# Patient Record
Sex: MALE | Race: BLACK OR AFRICAN AMERICAN
[De-identification: names, ages, dates, MRNs, and addresses within clinical notes are randomized per-mention and may not be internally consistent; named-entity substitution may affect disease eponyms.]

---

## 2022-05-20 ENCOUNTER — HOSPITAL ENCOUNTER (EMERGENCY)
Dept: HOSPITAL 61 - ER | Age: 22
Discharge: HOME | End: 2022-05-20
Payer: SELF-PAY

## 2022-05-20 VITALS — BODY MASS INDEX: 23.44 KG/M2 | HEIGHT: 75 IN | WEIGHT: 188.5 LBS

## 2022-05-20 VITALS — DIASTOLIC BLOOD PRESSURE: 79 MMHG | SYSTOLIC BLOOD PRESSURE: 146 MMHG

## 2022-05-20 DIAGNOSIS — J98.8: Primary | ICD-10-CM

## 2022-05-20 DIAGNOSIS — Z20.822: ICD-10-CM

## 2022-05-20 LAB
ALBUMIN SERPL-MCNC: 3.3 G/DL (ref 3.4–5)
ALBUMIN/GLOB SERPL: 0.7 {RATIO} (ref 1–1.7)
ALP SERPL-CCNC: 73 U/L (ref 46–116)
ALT SERPL-CCNC: 34 U/L (ref 16–63)
ANION GAP SERPL CALC-SCNC: 9 MMOL/L (ref 6–14)
AST SERPL-CCNC: 19 U/L (ref 15–37)
BASOPHILS # BLD AUTO: 0 X10^3/UL (ref 0–0.2)
BASOPHILS NFR BLD: 0 % (ref 0–3)
BILIRUB SERPL-MCNC: 1.2 MG/DL (ref 0.2–1)
BUN SERPL-MCNC: 13 MG/DL (ref 8–26)
BUN/CREAT SERPL: 12 (ref 6–20)
CALCIUM SERPL-MCNC: 9.3 MG/DL (ref 8.5–10.1)
CHLORIDE SERPL-SCNC: 100 MMOL/L (ref 98–107)
CO2 SERPL-SCNC: 27 MMOL/L (ref 21–32)
CREAT SERPL-MCNC: 1.1 MG/DL (ref 0.7–1.3)
EOSINOPHIL NFR BLD: 0 % (ref 0–3)
EOSINOPHIL NFR BLD: 0 X10^3/UL (ref 0–0.7)
ERYTHROCYTE [DISTWIDTH] IN BLOOD BY AUTOMATED COUNT: 13.1 % (ref 11.5–14.5)
GFR SERPLBLD BASED ON 1.73 SQ M-ARVRAT: 84.5 ML/MIN
GLUCOSE SERPL-MCNC: 123 MG/DL (ref 70–99)
HCT VFR BLD CALC: 38.5 % (ref 39–53)
HGB BLD-MCNC: 13.2 G/DL (ref 13–17.5)
INFLUENZA A PATIENT: NEGATIVE
INFLUENZA B PATIENT: NEGATIVE
LYMPHOCYTES # BLD: 0.9 X10^3/UL (ref 1–4.8)
LYMPHOCYTES NFR BLD AUTO: 13 % (ref 24–48)
MCH RBC QN AUTO: 30 PG (ref 25–35)
MCHC RBC AUTO-ENTMCNC: 34 G/DL (ref 31–37)
MCV RBC AUTO: 86 FL (ref 79–100)
MONO #: 1 X10^3/UL (ref 0–1.1)
MONOCYTES NFR BLD: 14 % (ref 0–9)
NEUT #: 5.2 X10^3/UL (ref 1.8–7.7)
NEUTROPHILS NFR BLD AUTO: 72 % (ref 31–73)
PLATELET # BLD AUTO: 179 X10^3/UL (ref 140–400)
POTASSIUM SERPL-SCNC: 3.8 MMOL/L (ref 3.5–5.1)
PROT SERPL-MCNC: 8.3 G/DL (ref 6.4–8.2)
RBC # BLD AUTO: 4.46 X10^6/UL (ref 4.3–5.7)
SODIUM SERPL-SCNC: 136 MMOL/L (ref 136–145)
WBC # BLD AUTO: 7.2 X10^3/UL (ref 4–11)

## 2022-05-20 PROCEDURE — 99285 EMERGENCY DEPT VISIT HI MDM: CPT

## 2022-05-20 PROCEDURE — 71045 X-RAY EXAM CHEST 1 VIEW: CPT

## 2022-05-20 PROCEDURE — 87428 SARSCOV & INF VIR A&B AG IA: CPT

## 2022-05-20 PROCEDURE — 87040 BLOOD CULTURE FOR BACTERIA: CPT

## 2022-05-20 PROCEDURE — 93005 ELECTROCARDIOGRAM TRACING: CPT

## 2022-05-20 PROCEDURE — 85025 COMPLETE CBC W/AUTO DIFF WBC: CPT

## 2022-05-20 PROCEDURE — 80053 COMPREHEN METABOLIC PANEL: CPT

## 2022-05-20 PROCEDURE — 96360 HYDRATION IV INFUSION INIT: CPT

## 2022-05-20 PROCEDURE — 83605 ASSAY OF LACTIC ACID: CPT

## 2022-05-20 PROCEDURE — 36415 COLL VENOUS BLD VENIPUNCTURE: CPT

## 2022-05-20 RX ADMIN — ALBUTEROL SULFATE ONE MG: 108 AEROSOL, METERED RESPIRATORY (INHALATION) at 22:21

## 2022-05-20 RX ADMIN — ALBUTEROL SULFATE ONE MG: 108 AEROSOL, METERED RESPIRATORY (INHALATION) at 22:50

## 2022-05-20 NOTE — PHYS DOC
Past Medical History


Past Surgical History:  No Surgical History


Smoking Status:  Never Smoker


Alcohol Use:  None





General Adult


EDM:


Chief Complaint:  SHORTNESS OF BREATH





HPI:


HPI:





Patient is a 21  year old male who presents with fever, body aches, shortness of

breath with right side pain.  States that he has had all this for the last 3 to 

4 days.  Recently diagnosed with schizophrenia.  He has not had any 

vaccinations.  Patient is febrile at 102.1.  Denies any other history.  Denies 

abdominal pain, nausea, vomiting, diarrhea, headache, dizziness, syncope, chest 

pain, vision change, focal weakness, numbness or tingling.





Review of Systems:


Review of Systems:


Constitutional:   Denies fever or chills. []


Eyes:   Denies change in visual acuity. []


HENT:   Denies nasal congestion or sore throat. [] 


Respiratory:   Denies cough or shortness of breath. [] 


Cardiovascular:   Denies chest pain or edema. [] 


GI:   Denies abdominal pain, nausea, vomiting, bloody stools or diarrhea. [] 


:  Denies dysuria. [] 


Musculoskeletal:   Denies back pain or joint pain. [] 


Integument:   Denies rash. [] 


Neurologic:   Denies headache, focal weakness or sensory changes. [] 


Endocrine:   Denies polyuria or polydipsia. [] 


Lymphatic:  Denies swollen glands. [] 


Psychiatric:  Denies depression or anxiety. []





Heart Score:


C/O Chest Pain:  No





Current Medications:





Current Medications








 Medications


  (Trade)  Dose


 Ordered  Sig/Ayala  Start Time


 Stop Time Status Last Admin


Dose Admin


 


 Acetaminophen


  (Tylenol)  1,000 mg  1X  ONCE  22 22:00


 22 22:01 DC 22 21:48


1,000 MG


 


 Albuterol Sulfate


  (Ventolin Neb


 Soln)  5 mg  1X  ONCE  22 22:00


 22 22:01 DC  





 


 Prednisone


  (Prednisone)  20 mg  1X  ONCE  22 22:00


 22 22:01 DC 22 21:48


20 MG


 


 Sodium Chloride  1,000 ml @ 


 1,000 mls/hr  1X  ONCE  22 22:00


 22 22:59  22 21:47


1,000 MLS/HR











Allergies:


Allergies:





Allergies








Coded Allergies Type Severity Reaction Last Updated Verified


 


  No Known Drug Allergies    22 No











Physical Exam:


PE:





Constitutional: Well developed, well nourished, no acute distress, non-toxic 

appearance. []


HENT: Normocephalic, atraumatic, bilateral external ears normal, oropharynx 

moist, no oral exudates, nose normal. []


Eyes: PERRLA, EOMI, conjunctiva normal, no discharge. [] 


Neck: Normal range of motion, no tenderness, supple, no stridor. [] 


Cardiovascular:Heart rate regular rhythm, no murmur []


Lungs & Thorax:  Bilateral breath sounds clear to auscultation []


Abdomen: Bowel sounds normal, soft, no tenderness, no masses, no pulsatile 

masses. [] 


Skin: Warm, dry, no erythema, no rash. [] 


Back: No tenderness, no CVA tenderness. [] 


Extremities: No tenderness, no cyanosis, no clubbing, ROM intact, no edema. [] 


Neurologic: Alert and oriented X 3, normal motor function, normal sensory 

function, no focal deficits noted. []


Psychologic: Affect normal, judgement normal, mood normal. []





Current Patient Data:


Labs:





                                Laboratory Tests








Test


 22


21:04


 


White Blood Count


 7.2 x10^3/uL


(4.0-11.0)


 


Red Blood Count


 4.46 x10^6/uL


(4.30-5.70)


 


Hemoglobin


 13.2 g/dL


(13.0-17.5)


 


Hematocrit


 38.5 %


(39.0-53.0)  L


 


Mean Corpuscular Volume


 86 fL ()





 


Mean Corpuscular Hemoglobin 30 pg (25-35)  


 


Mean Corpuscular Hemoglobin


Concent 34 g/dL


(31-37)


 


Red Cell Distribution Width


 13.1 %


(11.5-14.5)


 


Platelet Count


 179 x10^3/uL


(140-400)


 


Neutrophils (%) (Auto) 72 % (31-73)  


 


Lymphocytes (%) (Auto) 13 % (24-48)  L


 


Monocytes (%) (Auto) 14 % (0-9)  H


 


Eosinophils (%) (Auto) 0 % (0-3)  


 


Basophils (%) (Auto) 0 % (0-3)  


 


Neutrophils # (Auto)


 5.2 x10^3/uL


(1.8-7.7)


 


Lymphocytes # (Auto)


 0.9 x10^3/uL


(1.0-4.8)  L


 


Monocytes # (Auto)


 1.0 x10^3/uL


(0.0-1.1)


 


Eosinophils # (Auto)


 0.0 x10^3/uL


(0.0-0.7)


 


Basophils # (Auto)


 0.0 x10^3/uL


(0.0-0.2)


 


Sodium Level


 136 mmol/L


(136-145)


 


Potassium Level


 3.8 mmol/L


(3.5-5.1)


 


Chloride Level


 100 mmol/L


()


 


Carbon Dioxide Level


 27 mmol/L


(21-32)


 


Anion Gap 9 (6-14)  


 


Blood Urea Nitrogen


 13 mg/dL


(8-26)


 


Creatinine


 1.1 mg/dL


(0.7-1.3)


 


Estimated GFR


(Cockcroft-Gault) 84.5  





 


BUN/Creatinine Ratio 12 (6-20)  


 


Glucose Level


 123 mg/dL


(70-99)  H


 


Lactic Acid Level


 0.7 mmol/L


(0.4-2.0)


 


Calcium Level


 9.3 mg/dL


(8.5-10.1)


 


Total Bilirubin


 1.2 mg/dL


(0.2-1.0)  H


 


Aspartate Amino Transferase


(AST) 19 U/L (15-37)





 


Alanine Aminotransferase (ALT)


 34 U/L (16-63)





 


Alkaline Phosphatase


 73 U/L


()


 


Total Protein


 8.3 g/dL


(6.4-8.2)  H


 


Albumin


 3.3 g/dL


(3.4-5.0)  L


 


Albumin/Globulin Ratio


 0.7 (1.0-1.7)


L





                                Laboratory Tests


22 21:04








                                Laboratory Tests


22 21:04








Vital Signs:





                                   Vital Signs








  Date Time  Temp Pulse Resp B/P (MAP) Pulse Ox O2 Delivery O2 Flow Rate FiO2


 


22 20:36 102.0 119 32 146/79 (101) 97 Room Air  





 102.0       











EKG:


EKG:


[]





Radiology/Procedures:


Radiology/Procedures:


[]


Impression:


                            [facility]


                    [address] [place] [address] [phone]


                                        


                                 IMAGING REPORT





                                     Signed





PATIENT: WALT JUAREZ    ACCOUNT: [id number]     MRN#: [id number]


: 2000           LOCATION: ER              AGE: 21


SEX: M                    EXAM DT: 22         ACCESSION#: [id number]


STATUS: REG ER            ORD. PHYSICIAN: VALENTE DAWN


REASON: SOA


PROCEDURE: PORTABLE CHEST 1V





Exam: Chest one view





INDICATION: Short of air, pain





TECHNIQUE: Frontal view of the chest





Comparisons: None





FINDINGS:


The cardiomediastinal silhouette and pulmonary vessels are within normal limits.





Left basilar airspace disease. No pleural effusion





IMPRESSION:


Bibasilar airspace disease may relate to atelectasis or developing infectious 

process





Electronically signed by: Taiwo Calderon MD (2022 10:05 PM) [facility]














DICTATED and SIGNED BY:     TAIWO CALDERON MD


DATE:     22





Course & Med Decision Making:


Course & Med Decision Making


Pertinent Labs and Imaging studies reviewed. (See chart for details)





See HPI.  Alert and oriented x4.  Ambulatory steady gait.  Skin pink warm and 

dry.  Lungs are clear in upper lobes but diminished in lower lobes.  Patient is 

refusing a breathing treatment.  He did take the prednisone.  He is given 

Tylenol and fluids in the ED.





Patient's heart rate has come down.  Chest x-ray shows some possible developing 

pneumonia.  His rapid COVID and influenza are negative.  I will start him on 

antibiotics and a Medrol Dosepak.





Dragon Disclaimer:


Dragon Disclaimer:


This electronic medical record was generated, in whole or in part, using a voice

 recognition dictation system.





Departure


Departure


Impression:  


   Primary Impression:  


   Respiratory infection


Disposition:   HOME / SELF CARE / HOMELESS


Condition:  STABLE


Referrals:  


UNKNOWN PCP NAME (PCP)


Patient Instructions:  Pneumonia, Adult





Additional Instructions:  


Follow-up with your primary care provider this coming up week.  Take antibiotic 

as prescribed and with food.  Drink plenty fluids to stay hydrated.  Take 

Tylenol or ibuprofen to keep down your fever and to help with pain.  Make sure 

you finish all medication.  If begin having severe shortness of breath, chest 

pain return to the emergency room.


Scripts


Albuterol Sulfate (PROAIR HFA INHALER) 8.5 Gm Hfa.aer.ad


2 PUFF IH PRN Q4-6HRS PRN for wheezing for 21 Days, #1 INHALER 0 Refills


   Prov: VALENTE DAWN         22 


Azithromycin (AZITHROMYCIN TABLET) 250 Mg Tablet


1 PKG PO UD for 5 Days, #6 TAB 0 Refills


   2 the first day followed by 1 for days 2-5


   Prov: VALENTE DAWN         22 


Methylprednisolone (MEDROL) 4 Mg Tab.ds.pk


1 PKG PO UD, #1 PKG


   Prov: VALENTE DAWN         22











VALENTE DAWN            May 20, 2022 22:36

## 2022-05-20 NOTE — RAD
Exam: Chest one view



INDICATION: Short of air, pain



TECHNIQUE: Frontal view of the chest



Comparisons: None



FINDINGS:

The cardiomediastinal silhouette and pulmonary vessels are within normal limits.



Left basilar airspace disease. No pleural effusion



IMPRESSION:

Bibasilar airspace disease may relate to atelectasis or developing infectious process



Electronically signed by: Taiwo Fuentes MD (5/20/2022 10:05 PM) [facility]

## 2022-05-22 NOTE — EKG
[facility]

              [address] [place] [address]

Test Date:    2022               Test Time:    20:44:24

Pat Name:     WALT JUAREZ            Department:   

Patient ID:   [id number]           Room:          

Gender:       M                        Technician:   

:          2000               Requested By: VALENTE DAWN

Order Number: [id number]           Reading MD:   Xiang Hidalgo

                                 Measurements

Intervals                              Axis          

Rate:         107                      P:            -23

NC:           144                      QRS:          77

QRSD:         88                       T:            39

QT:           332                                    

QTc:          449                                    

                           Interpretive Statements

SINUS TACHYCARDIA

LEFT ATRIAL ABNORMALITY

INCOMPLETE RIGHT BUNDLE BRANCH BLOCK

Electronically Signed On 2022 11:18:04 CDT by Xiang Hidalgo